# Patient Record
Sex: MALE | Race: BLACK OR AFRICAN AMERICAN | NOT HISPANIC OR LATINO | Employment: OTHER | URBAN - METROPOLITAN AREA
[De-identification: names, ages, dates, MRNs, and addresses within clinical notes are randomized per-mention and may not be internally consistent; named-entity substitution may affect disease eponyms.]

---

## 2017-03-21 ENCOUNTER — LAB CONVERSION - ENCOUNTER (OUTPATIENT)
Dept: OTHER | Facility: OTHER | Age: 82
End: 2017-03-21

## 2017-03-21 LAB — 25(OH)D3 SERPL-MCNC: 24 NG/ML (ref 30–100)

## 2017-03-30 ENCOUNTER — ALLSCRIPTS OFFICE VISIT (OUTPATIENT)
Dept: OTHER | Facility: OTHER | Age: 82
End: 2017-03-30

## 2017-03-30 DIAGNOSIS — I10 ESSENTIAL (PRIMARY) HYPERTENSION: ICD-10-CM

## 2017-03-30 DIAGNOSIS — E03.9 HYPOTHYROIDISM: ICD-10-CM

## 2017-05-11 ENCOUNTER — ALLSCRIPTS OFFICE VISIT (OUTPATIENT)
Dept: OTHER | Facility: OTHER | Age: 82
End: 2017-05-11

## 2017-10-16 ENCOUNTER — ALLSCRIPTS OFFICE VISIT (OUTPATIENT)
Dept: OTHER | Facility: OTHER | Age: 82
End: 2017-10-16

## 2017-10-16 DIAGNOSIS — G62.9 POLYNEUROPATHY: ICD-10-CM

## 2017-10-16 DIAGNOSIS — I10 ESSENTIAL (PRIMARY) HYPERTENSION: ICD-10-CM

## 2017-10-16 DIAGNOSIS — E03.9 HYPOTHYROIDISM: ICD-10-CM

## 2018-01-13 VITALS — SYSTOLIC BLOOD PRESSURE: 120 MMHG | HEART RATE: 62 BPM | DIASTOLIC BLOOD PRESSURE: 73 MMHG

## 2018-01-13 VITALS
HEIGHT: 70 IN | WEIGHT: 134.25 LBS | BODY MASS INDEX: 19.22 KG/M2 | OXYGEN SATURATION: 98 % | SYSTOLIC BLOOD PRESSURE: 122 MMHG | HEART RATE: 72 BPM | DIASTOLIC BLOOD PRESSURE: 68 MMHG

## 2018-01-16 NOTE — PROGRESS NOTES
Assessment    1  Hypertension, benign (401 1) (I10)   2  Hypothyroidism in adult (244 9) (E03 9)   3  Neuropathy, peripheral (356 9) (G62 9)   4  Hearing loss (389 9) (H91 90)    Plan  Hypertension, benign    · (1) CBC/PLT/DIFF; Status:Active; Requested for:16Oct2017;    · (1) COMPREHENSIVE METABOLIC PANEL; Status:Active; Requested for:16Oct2017;    · (1) LIPID PANEL, FASTING; Status:Active; Requested for:16Oct2017;    · (1) URINE PROTEIN CREATININE RATIO; Status:Active; Requested for:16Oct2017;    · There are many exercise options for seniors ; Status:Complete;   Done: [id number]   · Follow-up visit in 4 Months Evaluation and Treatment  Follow-up  Status: Complete  Done:  [id number]  Hypothyroidism in adult    · (1) TSH; Status:Active; Requested for:16Oct2017;    · (1) VITAMIN B12; Status:Active; Requested for:16Oct2017;   Need for influenza vaccination    · Fluzone High-Dose 0 5 ML Intramuscular Suspension Prefilled Syringe  Neuropathy, peripheral    · (1) FOLATE; Status:Active; Requested for:16Oct2017;     Discussion/Summary    Well check   reviewed anticipatory guidance with pt and son   encouraged to complete 5 wishes and polst , given to family  Impression: Subsequent Annual Wellness Visit  Cardiovascular screening and counseling: the risks and benefits of screening were discussed, counseling was given on maintaining a healthy diet and counseling was given on ways to improve cholesterol  Diabetes screening and counseling: the risks and benefits of screening were discussed, screening is current and counseling was given on maintaining a healthy diet  Colorectal cancer screening and counseling: the patient declines screening  Prostate cancer screening and counseling: the patient declines screening   Immunizations: the risks and benefits of influenza vaccination were discussed with the patient, the patient declines the influenza vaccination, the risks and benefits of pneumococcal vaccination were discussed with the patient, the patient declines the hepatitis vaccination series and Zostavax vaccination up to date  Advance Directive Planning: complete and up to date, he was encouraged to follow-up with me to discuss his questions and/or decisions  Possible side effects of new medications were reviewed with the patient/guardian today  The treatment plan was reviewed with the patient/guardian  The patient/guardian understands and agrees with the treatment plan      Chief Complaint  AWV      History of Present Illness  HPI: here fro wellness check here with son   son is a contributor in hx pt not wearing hearing aids , does have a problem, with t he hearing but refuses   other wise doing well appetite great   bowels normal , no complaints   Welcome to Medicare and Wellness Visits: The patient is being seen for the subsequent annual wellness visit  Medicare Screening and Risk Factors   Hospitalizations: no previous hospitalizations  Medicare Screening Tests Risk Questions   Abdominal aortic aneurysm risk assessment: none indicated  Osteoporosis risk assessment: none indicated  HIV risk assessment: none indicated  Drug and Alcohol Use: The patient is a former cigarette smoker  The patient reports rare alcohol use  Alcohol concern:   The patient has no concerns about alcohol abuse  He has never used illicit drugs  Diet and Physical Activity: Current diet includes well balanced meals, 1 servings of fruit per day, 2-3 servings of vegetables per day, 1 servings of meat per day, 1 servings of whole grains per day, 1 servings of simple carbohydrates per day, 1 servings of dairy products per day, 1 cups of coffee per day, 0 cups of tea per day, 1 cans of regular soda per day and 0 cans of diet soda per day  He exercises daily  Exercise: walking 15 minutes per day     Mood Disorder and Cognitive Impairment Screening: PHQ-9 Depression Scale   Over the past 2 weeks, how often have you been bothered by the following problems? 1 ) Little interest or pleasure in doing things? Several days  2 ) Feeling down, depressed or hopeless? Not at all    3 ) Trouble falling asleep or sleeping too much? Not at all    4 ) Feeling tired or having little energy? Not at all    5 ) Poor appetite or overeating? Not at all    6 ) Feeling bad about yourself, or that you are a failure, or have let yourself or your family down? Not at all    7 ) Trouble concentrating on things, such as reading a newspaper or watching television? Not at all    8 ) Moving or speaking so slowly that other people could have noticed, or the opposite, moving or speaking faster than usual? Not at all    9 ) Thoughts that you would be off dead or of hurting yourself in some way? Not at all  Functional Ability/Level of Safety: Hearing is slightly decreased in the right ear and significantly decreased in the left ear  He uses a hearing aid  The patient is currently able to do activities of daily living with limitations, able to do instrumental activities of daily living with limitations, able to participate in social activities with limitations and unable to drive  Activities of daily living details: needs help using the phone, transportation help needed, needs help shopping, meal preparation help needed, needs help doing laundry and needs help managing money, but does not need help managing medications  Fall risk factors: The patient fell 0 times in the past 12 months  Home safety risk factors:  no grab bars in the bathroom, but no unfamiliar surroundings, no loose rugs, no poor household lighting, no uneven floors, no household clutter and handrails on the stairs  Advance Directives: Advance directives: no living will and no advance directives  Co-Managers and Medical Equipment/Suppliers: See Patient Care Team   Preventive Quality Program 65 and Older: Falls Risk: The patient fell times in the past 12 months   The patient is currently asymptomatic Symptoms Include:  Associated symptoms:  No associated symptoms are reported  Current treatment includes use of a walker  The patient currently has no urinary incontinence symptoms  Patient Care Team    Care Team Member Role Specialty Office Number   Nery Hernandez MD Specialist Neurology [phone]   Kian HILL  Family Medicine [phone]   Megan Lopes   [phone]   Maria Esther Mcclelland   [phone]     Active Problems    1  Dysesthesia (782 0) (R20 8)   2  Hearing loss (389 9) (H91 90)   3  [place] (hard of hearing) (389 9) (H91 90)   4  Hypertension, benign (401 1) (I10)   5  Hypothyroidism in adult (244 9) (E03 9)   6  Neuropathy, peripheral (356 9) (G62 9)   7  Screening for genitourinary condition (V81 6) (Z13 89)   8  Thyroid disorder (246 9) (E07 9)    Past Medical History    · History of malignant neoplasm of prostate (V10 46) (Z85 46)    Surgical History    · History of Hernia Repair    Family History  Mother    · Denied: Family history of mental disorder   · Denied: Family history of substance abuse   · Family history unknown (V49 89) (Z78 9)  Father    · Denied: Family history of mental disorder   · Denied: Family history of substance abuse   · Family history unknown (V49 89) (Z68 5)  Son    · Family history of type 2 diabetes mellitus (V18 0) (Z83 3)  Sister    · Family history of type 2 diabetes mellitus (V18 0) (Z83 3)  Family History    · Family history unknown (V49 89) (Z78 9)    Social History    · Always uses seat belt   · Former smoker (V15 82) (X83 651)   · Former smoker, stopped smoking many years ago (V15 82) (Z87 891)   · No alcohol use   · Retired   · Seeing a dentist   ·     Current Meds   1  AmLODIPine Besylate 2 5 MG Oral Tablet; TAKE 1 TABLET DAILY  Requested for:   01Aug2017; Last Rx:01Aug2017 Ordered   2  Aspir-81 TBEC; TAKE 1 TABLET DAILY; Therapy: ([place]) to Recorded   3   Esomeprazole Magnesium 40 MG Oral Capsule Delayed Release; TAKE 1 CAPSULE   DAILY  Requested for: 56MWU3890; Last Rx:88Kzm2303 Ordered   4  Gabapentin 100 MG Oral Capsule; TAKE 1 CAPSULE AT BEDTIME; Therapy: 09JFZ1349 to (Maria A Ayala)  Requested for: 88Een8634; Last   Rx:95Uei2710 Ordered   5  Levothyroxine Sodium 75 MCG Oral Tablet; TAKE 1 TABLET DAILY  Requested for:   82Vvt0699; Last Rx:[id number] Ordered   6  Meloxicam 7 5 MG Oral Tablet; Take 1 tablet daily; Therapy: (Recorded:11May2017) to Recorded   7  Tamsulosin HCl - 0 4 MG Oral Capsule; take 1 capsule daily  Requested for: 81EPP9246;   Last Rx:21Ygt5624 Ordered    Allergies    1  No Known Drug Allergies    2  No Known Environmental Allergies   3  No Known Food Allergies    Vitals  Signs    Temperature: 96 F  Heart Rate: 58  Systolic: 238  Diastolic: 62  Height: 5 ft 10 in  Weight: 128 lb 4 oz  BMI Calculated: 18 4  BSA Calculated: 1 73  O2 Saturation: 95    Future Appointments    Date/Time Provider Specialty Site   02/20/2018 09:15 AM Ivette Baldwin, 10 [facility]   04/16/2018 09:15 AM Ivette Baldwin, [facility]     Signatures   Electronically signed by :  ANGLE Lemon; Oct 16 2017  3:11PM EST                       (Author)    Electronically signed by : PAPO Medel ; Oct 16 2017  4:00PM EST

## 2018-01-22 VITALS
OXYGEN SATURATION: 95 % | WEIGHT: 128.25 LBS | DIASTOLIC BLOOD PRESSURE: 62 MMHG | BODY MASS INDEX: 18.36 KG/M2 | HEART RATE: 58 BPM | HEIGHT: 70 IN | TEMPERATURE: 96 F | SYSTOLIC BLOOD PRESSURE: 124 MMHG

## 2018-02-18 DIAGNOSIS — G60.9 IDIOPATHIC PERIPHERAL NEUROPATHY: Primary | ICD-10-CM

## 2018-02-19 RX ORDER — GABAPENTIN 100 MG/1
CAPSULE ORAL
Qty: 30 CAPSULE | Refills: 1 | Status: SHIPPED | OUTPATIENT
Start: 2018-02-19 | End: 2018-05-13 | Stop reason: SDUPTHER

## 2018-02-23 LAB
ALBUMIN SERPL-MCNC: 3.8 G/DL (ref 3.6–5.1)
ALBUMIN/GLOB SERPL: 1.1 (CALC) (ref 1–2.5)
ALP SERPL-CCNC: 90 U/L (ref 40–115)
ALT SERPL-CCNC: 6 U/L (ref 9–46)
AST SERPL-CCNC: 14 U/L (ref 10–35)
BASOPHILS # BLD AUTO: 30 CELLS/UL (ref 0–200)
BASOPHILS NFR BLD AUTO: 0.5 %
BILIRUB SERPL-MCNC: 0.7 MG/DL (ref 0.2–1.2)
BUN SERPL-MCNC: 15 MG/DL (ref 7–25)
BUN/CREAT SERPL: 11 (CALC) (ref 6–22)
CALCIUM SERPL-MCNC: 9.4 MG/DL (ref 8.6–10.3)
CHLORIDE SERPL-SCNC: 100 MMOL/L (ref 98–110)
CHOLEST SERPL-MCNC: 202 MG/DL
CHOLEST/HDLC SERPL: 2.4 (CALC)
CO2 SERPL-SCNC: 27 MMOL/L (ref 20–31)
CREAT SERPL-MCNC: 1.4 MG/DL (ref 0.7–1.11)
EOSINOPHIL # BLD AUTO: 41 CELLS/UL (ref 15–500)
EOSINOPHIL NFR BLD AUTO: 0.7 %
ERYTHROCYTE [DISTWIDTH] IN BLOOD BY AUTOMATED COUNT: 14.4 % (ref 11–15)
GLOBULIN SER CALC-MCNC: 3.4 G/DL (CALC) (ref 1.9–3.7)
GLUCOSE SERPL-MCNC: 127 MG/DL (ref 65–99)
HCT VFR BLD AUTO: 33.3 % (ref 38.5–50)
HDLC SERPL-MCNC: 84 MG/DL
HGB BLD-MCNC: 11 G/DL (ref 13.2–17.1)
LDLC SERPL CALC-MCNC: 103 MG/DL (CALC)
LYMPHOCYTES # BLD AUTO: 1870 CELLS/UL (ref 850–3900)
LYMPHOCYTES NFR BLD AUTO: 31.7 %
MCH RBC QN AUTO: 26.6 PG (ref 27–33)
MCHC RBC AUTO-ENTMCNC: 33 G/DL (ref 32–36)
MCV RBC AUTO: 80.4 FL (ref 80–100)
MONOCYTES # BLD AUTO: 531 CELLS/UL (ref 200–950)
MONOCYTES NFR BLD AUTO: 9 %
NEUTROPHILS # BLD AUTO: 3428 CELLS/UL (ref 1500–7800)
NEUTROPHILS NFR BLD AUTO: 58.1 %
NONHDLC SERPL-MCNC: 118 MG/DL (CALC)
PLATELET # BLD AUTO: 181 THOUSAND/UL (ref 140–400)
PMV BLD REES-ECKER: 10.6 FL (ref 7.5–12.5)
POTASSIUM SERPL-SCNC: 4 MMOL/L (ref 3.5–5.3)
PROT SERPL-MCNC: 7.2 G/DL (ref 6.1–8.1)
RBC # BLD AUTO: 4.14 MILLION/UL (ref 4.2–5.8)
SL AMB EGFR AFRICAN AMERICAN: 49 ML/MIN/1.73M2
SL AMB EGFR NON AFRICAN AMERICAN: 42 ML/MIN/1.73M2
SODIUM SERPL-SCNC: 136 MMOL/L (ref 135–146)
TRIGL SERPL-MCNC: 60 MG/DL
TSH SERPL-ACNC: 3.65 MIU/L (ref 0.4–4.5)
WBC # BLD AUTO: 5.9 THOUSAND/UL (ref 3.8–10.8)

## 2018-03-01 ENCOUNTER — OFFICE VISIT (OUTPATIENT)
Dept: FAMILY MEDICINE CLINIC | Facility: CLINIC | Age: 83
End: 2018-03-01
Payer: MEDICARE

## 2018-03-01 VITALS
OXYGEN SATURATION: 98 % | HEIGHT: 74 IN | WEIGHT: 125 LBS | BODY MASS INDEX: 16.04 KG/M2 | DIASTOLIC BLOOD PRESSURE: 72 MMHG | TEMPERATURE: 96.7 F | SYSTOLIC BLOOD PRESSURE: 138 MMHG | HEART RATE: 71 BPM | RESPIRATION RATE: 14 BRPM

## 2018-03-01 DIAGNOSIS — I10 ESSENTIAL HYPERTENSION: ICD-10-CM

## 2018-03-01 DIAGNOSIS — C61 PROSTATE CANCER (HCC): ICD-10-CM

## 2018-03-01 DIAGNOSIS — E03.9 HYPOTHYROIDISM, UNSPECIFIED TYPE: ICD-10-CM

## 2018-03-01 DIAGNOSIS — D64.9 ANEMIA, UNSPECIFIED TYPE: Primary | ICD-10-CM

## 2018-03-01 PROCEDURE — 99214 OFFICE O/P EST MOD 30 MIN: CPT | Performed by: FAMILY MEDICINE

## 2018-03-01 RX ORDER — TRAVOPROST 0.004 %
1 DROPS OPHTHALMIC (EYE)
Refills: 6 | COMMUNITY
Start: 2018-01-31

## 2018-03-01 NOTE — PROGRESS NOTES
Assessment/Plan:   anemia  Age related stable no changes in therapy  Hypothyroidism  Stable TSH within normal parameters to continue current medication  Prostate cancer   Stable currently under the care of Urology with [facility] Dr Jose A Jorge  Hypertension  Stable to continue current medications  Encourage patient to walk about the house reviewed with family and courage the same possibly getting small hand weights/ bands, discussed safety, fall risks       Diagnoses and all orders for this visit:    Anemia, unspecified type    Hypothyroidism, unspecified type    Prostate cancer ([facility] [address] )    Essential hypertension    Other orders  -     TRAVATAN Z 0 004 % ophthalmic solution; Administer 1 drop to both eyes daily at bedtime  -     CBC and differential; Future  -     Comprehensive metabolic panel; Future  -     Iron Saturation %; Future  -     Ferritin; Future  -     Folate; Future  -     TSH, 3rd generation; Future  -     Iron; Future  -     Vitamin B12; Future              Subjective:      Patient ID: Rogelio Miller is a 80 y o  male  Here  To f/u on the labs   With son , giving history , pt does not wear aids   Doing well   appetite fine , still can't get him away from the miralax , insists on taking it daily,  but bowel movements are very regular neg diarrhea , weight unchanged  Less active about the house , has the walker , have eliminated potential tripping problems   Family member always at home to attend to his needs  Neg cp , palpitations , sob, sara ,           The following portions of the patient's history were reviewed and updated as appropriate:   He has a past medical history of Prostate cancer ([facility] [address] )  ,   does not have a problem list on file  ,   has a past surgical history that includes Hernia repair  ,  family history includes Diabetes type II in his sister and son; No Known Problems in his father and mother  ,   reports that he has quit smoking   He has never used smokeless tobacco  He reports that he does not drink alcohol  His drug history is not on file  ,  has No Known Allergies         Review of Systems   Constitutional: Negative for appetite change, chills, fever and unexpected weight change  HENT: Negative for congestion, dental problem, ear pain, hearing loss, postnasal drip, rhinorrhea, sinus pain, sinus pressure, sneezing, sore throat, tinnitus and voice change  Eyes: Negative for visual disturbance  Respiratory: Negative for apnea, cough, chest tightness and shortness of breath  Cardiovascular: Negative for chest pain, palpitations and leg swelling  Gastrointestinal: Negative for abdominal pain, blood in stool, constipation, diarrhea, nausea and vomiting  Endocrine: Negative for cold intolerance, heat intolerance, polydipsia, polyphagia and polyuria  Genitourinary: Negative for decreased urine volume, difficulty urinating, dysuria, frequency and hematuria  Musculoskeletal: Negative for arthralgias, back pain, gait problem, joint swelling and myalgias  Skin: Negative for color change, rash and wound  Allergic/Immunologic: Negative for environmental allergies and food allergies  Neurological: Negative for dizziness, syncope, weakness, light-headedness, numbness and headaches  Hematological: Negative for adenopathy  Does not bruise/bleed easily  Psychiatric/Behavioral: Negative for sleep disturbance and suicidal ideas  The patient is not nervous/anxious  Objective:  Vitals:    03/01/18 1029   BP: 138/72   Pulse: 71   Resp: 14   Temp: (!) 96 7 °F (35 9 °C)   SpO2: 98%      Physical Exam   Constitutional: He is oriented to person, place, and time  He appears well-nourished  No distress  Alert, chronically ill, appears stated age  Extremely hard of hearing refusing to wear hearing aids,   HENT:   Head: Normocephalic and atraumatic     Right Ear: External ear normal    Left Ear: External ear normal    Mouth/Throat: Oropharynx is clear and moist    Eyes: Conjunctivae and EOM are normal    Neck: Normal range of motion  Neck supple  Cardiovascular: Normal rate and regular rhythm  Pulmonary/Chest: Effort normal and breath sounds normal    Musculoskeletal: Normal range of motion  Needs minimal assistance with ambulation, uses four-point walker  General deconditioning   Neurological: He is oriented to person, place, and time  Skin: Skin is warm and dry  Psychiatric: He has a normal mood and affect   His behavior is normal  Judgment and thought content normal

## 2018-03-20 DIAGNOSIS — E03.9 HYPOTHYROIDISM, UNSPECIFIED TYPE: Primary | ICD-10-CM

## 2018-03-20 RX ORDER — LEVOTHYROXINE SODIUM 0.07 MG/1
75 TABLET ORAL DAILY
Qty: 90 TABLET | Refills: 1 | Status: SHIPPED | OUTPATIENT
Start: 2018-03-20 | End: 2018-09-15 | Stop reason: SDUPTHER

## 2018-05-04 DIAGNOSIS — N40.1 BENIGN PROSTATIC HYPERPLASIA WITH LOWER URINARY TRACT SYMPTOMS, SYMPTOM DETAILS UNSPECIFIED: Primary | ICD-10-CM

## 2018-05-06 RX ORDER — TAMSULOSIN HYDROCHLORIDE 0.4 MG/1
0.4 CAPSULE ORAL DAILY
Qty: 30 CAPSULE | Refills: 5 | Status: SHIPPED | OUTPATIENT
Start: 2018-05-06 | End: 2018-11-10 | Stop reason: SDUPTHER

## 2018-05-13 DIAGNOSIS — G60.9 IDIOPATHIC PERIPHERAL NEUROPATHY: ICD-10-CM

## 2018-05-14 RX ORDER — GABAPENTIN 100 MG/1
CAPSULE ORAL
Qty: 30 CAPSULE | Refills: 1 | Status: SHIPPED | OUTPATIENT
Start: 2018-05-14 | End: 2018-07-18 | Stop reason: SDUPTHER

## 2018-06-07 DIAGNOSIS — I10 ESSENTIAL HYPERTENSION: Primary | ICD-10-CM

## 2018-06-07 RX ORDER — AMLODIPINE BESYLATE 2.5 MG/1
2.5 TABLET ORAL DAILY
Qty: 90 TABLET | Refills: 1 | Status: SHIPPED | OUTPATIENT
Start: 2018-06-07

## 2018-06-15 DIAGNOSIS — K21.9 GASTROESOPHAGEAL REFLUX DISEASE WITHOUT ESOPHAGITIS: Primary | ICD-10-CM

## 2018-06-15 RX ORDER — ESOMEPRAZOLE MAGNESIUM 40 MG/1
40 CAPSULE, DELAYED RELEASE ORAL DAILY
Qty: 90 CAPSULE | Refills: 0 | Status: SHIPPED | OUTPATIENT
Start: 2018-06-15 | End: 2018-09-15 | Stop reason: SDUPTHER

## 2018-07-18 DIAGNOSIS — G60.9 IDIOPATHIC PERIPHERAL NEUROPATHY: ICD-10-CM

## 2018-07-18 RX ORDER — GABAPENTIN 100 MG/1
CAPSULE ORAL
Qty: 30 CAPSULE | Refills: 1 | Status: SHIPPED | OUTPATIENT
Start: 2018-07-18

## 2018-07-31 ENCOUNTER — OFFICE VISIT (OUTPATIENT)
Dept: FAMILY MEDICINE CLINIC | Facility: CLINIC | Age: 83
End: 2018-07-31
Payer: MEDICARE

## 2018-07-31 VITALS
HEIGHT: 74 IN | BODY MASS INDEX: 15.78 KG/M2 | TEMPERATURE: 96.9 F | OXYGEN SATURATION: 98 % | DIASTOLIC BLOOD PRESSURE: 80 MMHG | WEIGHT: 123 LBS | SYSTOLIC BLOOD PRESSURE: 128 MMHG | HEART RATE: 67 BPM

## 2018-07-31 DIAGNOSIS — C61 PROSTATE CANCER (HCC): ICD-10-CM

## 2018-07-31 DIAGNOSIS — E03.9 HYPOTHYROIDISM, UNSPECIFIED TYPE: Primary | ICD-10-CM

## 2018-07-31 DIAGNOSIS — I10 ESSENTIAL HYPERTENSION: ICD-10-CM

## 2018-07-31 PROCEDURE — 99214 OFFICE O/P EST MOD 30 MIN: CPT | Performed by: FAMILY MEDICINE

## 2018-07-31 NOTE — PROGRESS NOTES
Assessment/Plan:         Diagnoses and all orders for this visit:    Hypothyroidism, unspecified type    Essential hypertension    Prostate cancer ([facility] [address] )          Hypothyroidism  Stable, to continue current medications Rx for updated TSH  Hypertension   Stable, to continue current medications safety precautions,  will obtain updated CMP  Prostate cancer  Stable, under care Urology to continue current therapy plan next  Immunizations up-to-date    Subjective:      Patient ID: Angeles Ramirez is a 80 y o  male  Here with son  Doing well no changes in medications appetite is fine, no weight loss no change in bowel habits  Energy levels have been without change looking forward to another  Birth day,   continues  To see Urology in regard to prostate cancer gets  Regular shot along with pills, no change in urinary pattern flow  Blood pressure no changes checks infrequently  Thyroid not changes in thyroid medicine has not gone for blood work since last   negative shortness of breath difficulty in breathing chest pain palpitations still ambulating with the use of the walker doing well, no falls at home          The following portions of the patient's history were reviewed and updated as appropriate:   He has a past medical [facility]  ,   does not have a problem list on file  ,   has a past surgical history that includes Hernia repair  ,  family history includes Diabetes type II in his sister and son; No Known Problems in his father and mother  ,   reports that he has quit smoking  He has never used smokeless tobacco  He reports that he does not drink alcohol  His drug history is not on file  ,  has No Known Allergies         Review of Systems   Constitutional: Negative for appetite change, chills, fever and unexpected weight change     HENT: Negative for congestion, dental problem, ear pain, hearing loss, postnasal drip, rhinorrhea, sinus pain, sinus pressure, sneezing, sore throat, tinnitus and voice change  Eyes: Negative for visual disturbance  Respiratory: Negative for apnea, cough, chest tightness and shortness of breath  Cardiovascular: Negative for chest pain, palpitations and leg swelling  Gastrointestinal: Negative for abdominal pain, blood in stool, constipation, diarrhea, nausea and vomiting  Endocrine: Negative for cold intolerance, heat intolerance, polydipsia, polyphagia and polyuria  Genitourinary: Negative for decreased urine volume, difficulty urinating, dysuria, frequency and hematuria  Musculoskeletal: Negative for arthralgias, back pain, gait problem, joint swelling and myalgias  Skin: Negative for color change, rash and wound  Allergic/Immunologic: Negative for environmental allergies and food allergies  Neurological: Negative for dizziness, syncope, weakness, light-headedness, numbness and headaches  Hematological: Negative for adenopathy  Does not bruise/bleed easily  Psychiatric/Behavioral: Negative for sleep disturbance and suicidal ideas  The patient is not nervous/anxious  Objective:  Vitals:    07/31/18 1011   BP: 128/80   Pulse: 67   Temp: (!) 96 9 °F (36 1 °C)   SpO2: 98%      Physical Exam   Constitutional: He is oriented to person, place, and time  He appears well-developed and well-nourished  Appears stated age, frail ,conversational,  Ambulating with the assistance walker roller   HENT:   Head: Normocephalic and atraumatic  Eyes: Conjunctivae are normal    Neck: Normal range of motion  Neck supple  Cardiovascular: Normal rate, regular rhythm and normal heart sounds  Pulmonary/Chest: Effort normal and breath sounds normal    Musculoskeletal: Normal range of motion  Neurological: He is alert and oriented to person, place, and time  Skin: Skin is warm and dry  Psychiatric: He has a normal mood and affect   His behavior is normal  Judgment and thought content normal

## 2018-09-15 DIAGNOSIS — K21.9 GASTROESOPHAGEAL REFLUX DISEASE WITHOUT ESOPHAGITIS: ICD-10-CM

## 2018-09-15 DIAGNOSIS — E03.9 HYPOTHYROIDISM, UNSPECIFIED TYPE: ICD-10-CM

## 2018-09-17 RX ORDER — LEVOTHYROXINE SODIUM 0.07 MG/1
75 TABLET ORAL DAILY
Qty: 90 TABLET | Refills: 1 | Status: SHIPPED | OUTPATIENT
Start: 2018-09-17

## 2018-09-17 RX ORDER — ESOMEPRAZOLE MAGNESIUM 40 MG/1
CAPSULE, DELAYED RELEASE ORAL
Qty: 90 CAPSULE | Refills: 0 | Status: SHIPPED | OUTPATIENT
Start: 2018-09-17

## 2018-11-10 DIAGNOSIS — N40.1 BENIGN PROSTATIC HYPERPLASIA WITH LOWER URINARY TRACT SYMPTOMS, SYMPTOM DETAILS UNSPECIFIED: ICD-10-CM

## 2018-11-12 RX ORDER — TAMSULOSIN HYDROCHLORIDE 0.4 MG/1
CAPSULE ORAL
Qty: 30 CAPSULE | Refills: 5 | Status: SHIPPED | OUTPATIENT
Start: 2018-11-12